# Patient Record
Sex: FEMALE | Race: WHITE | URBAN - METROPOLITAN AREA
[De-identification: names, ages, dates, MRNs, and addresses within clinical notes are randomized per-mention and may not be internally consistent; named-entity substitution may affect disease eponyms.]

---

## 2017-01-06 ENCOUNTER — TELEPHONE (OUTPATIENT)
Dept: GASTROENTEROLOGY | Facility: OUTPATIENT CENTER | Age: 52
End: 2017-01-06

## 2017-01-17 ENCOUNTER — TELEPHONE (OUTPATIENT)
Dept: GASTROENTEROLOGY | Facility: OUTPATIENT CENTER | Age: 52
End: 2017-01-17

## 2017-01-17 DIAGNOSIS — Z12.11 ENCOUNTER FOR SCREENING COLONOSCOPY: Primary | ICD-10-CM

## 2017-01-17 RX ORDER — SIMETHICONE 125 MG
125 CAPSULE ORAL SEE ADMIN INSTRUCTIONS
Qty: 1 CAPSULE | Refills: 0 | Status: SHIPPED | OUTPATIENT
Start: 2017-01-17 | End: 2017-10-24

## 2017-01-17 RX ORDER — PEG-3350, SODIUM SULFATE, SODIUM CHLORIDE, POTASSIUM CHLORIDE, SODIUM ASCORBATE AND ASCORBIC ACID 7.5-2.691G
1 KIT ORAL SEE ADMIN INSTRUCTIONS
Qty: 1 EACH | Refills: 0 | Status: SHIPPED | OUTPATIENT
Start: 2017-01-17 | End: 2017-10-24

## 2017-01-17 NOTE — TELEPHONE ENCOUNTER
Patient taking any blood thinners ? no    Heart disease ? denies    Lung disease ? denies      Sleep apnea ? denies    Diabetic ? denies    Kidney disease ? denies    Dialysis ? n/a    Electronic implanted medical devices ? denies    Are you taking any narcotic pain medication ? no  What is your daily dosage ?    PTSD ? n/a    Prep instructions reviewed with patient ? Instructions,  policy, conscious sedation plan reviewed. Advised pt. To have someone stay with her post exam    [facility] : [facility]    Indication for procedure : screening colonoscopy    Referring provider : Dr. Chelsey Quintero

## 2017-01-20 ENCOUNTER — TRANSFERRED RECORDS (OUTPATIENT)
Dept: HEALTH INFORMATION MANAGEMENT | Facility: CLINIC | Age: 52
End: 2017-01-20

## 2017-01-23 RX ORDER — OMEPRAZOLE 40 MG/1
CAPSULE, DELAYED RELEASE ORAL
Qty: 90 CAPSULE | Refills: 0 | OUTPATIENT
Start: 2017-01-23

## 2017-02-23 ENCOUNTER — MYC MEDICAL ADVICE (OUTPATIENT)
Dept: INTERNAL MEDICINE | Facility: CLINIC | Age: 52
End: 2017-02-23

## 2017-02-23 DIAGNOSIS — R05.9 COUGH: ICD-10-CM

## 2017-02-23 RX ORDER — OMEPRAZOLE 40 MG/1
40 CAPSULE, DELAYED RELEASE ORAL 2 TIMES DAILY
Qty: 90 CAPSULE | Refills: 1 | Status: SHIPPED | OUTPATIENT
Start: 2017-02-23 | End: 2017-05-25

## 2017-02-23 NOTE — TELEPHONE ENCOUNTER
Refill request received for Omeprazole . Her last clinic appointment was 10/19/16. Refill completed per [facility] RN protocol.

## 2017-05-25 DIAGNOSIS — R05.9 COUGH: ICD-10-CM

## 2017-05-26 RX ORDER — OMEPRAZOLE 40 MG/1
CAPSULE, DELAYED RELEASE ORAL
Qty: 90 CAPSULE | Refills: 0 | Status: SHIPPED | OUTPATIENT
Start: 2017-05-26 | End: 2017-10-24

## 2017-10-24 ENCOUNTER — OFFICE VISIT (OUTPATIENT)
Dept: INTERNAL MEDICINE | Facility: CLINIC | Age: 52
End: 2017-10-24
Attending: INTERNAL MEDICINE
Payer: COMMERCIAL

## 2017-10-24 VITALS
BODY MASS INDEX: 47.55 KG/M2 | HEART RATE: 94 BPM | WEIGHT: 277 LBS | SYSTOLIC BLOOD PRESSURE: 132 MMHG | DIASTOLIC BLOOD PRESSURE: 86 MMHG

## 2017-10-24 DIAGNOSIS — R05.9 COUGH: ICD-10-CM

## 2017-10-24 DIAGNOSIS — Z00.00 ROUTINE GENERAL MEDICAL EXAMINATION AT A HEALTH CARE FACILITY: Primary | ICD-10-CM

## 2017-10-24 LAB
ANION GAP SERPL CALCULATED.3IONS-SCNC: 7 MMOL/L (ref 3–14)
BUN SERPL-MCNC: 18 MG/DL (ref 7–30)
CALCIUM SERPL-MCNC: 8.7 MG/DL (ref 8.5–10.1)
CHLORIDE SERPL-SCNC: 106 MMOL/L (ref 94–109)
CHOLEST SERPL-MCNC: 237 MG/DL
CO2 SERPL-SCNC: 26 MMOL/L (ref 20–32)
CREAT SERPL-MCNC: 0.72 MG/DL (ref 0.52–1.04)
GFR SERPL CREATININE-BSD FRML MDRD: 85 ML/MIN/1.7M2
GLUCOSE SERPL-MCNC: 97 MG/DL (ref 70–99)
HBA1C MFR BLD: 5.3 % (ref 4.3–6)
HDLC SERPL-MCNC: 64 MG/DL
LDLC SERPL CALC-MCNC: 131 MG/DL
NONHDLC SERPL-MCNC: 173 MG/DL
POTASSIUM SERPL-SCNC: 4 MMOL/L (ref 3.4–5.3)
SODIUM SERPL-SCNC: 139 MMOL/L (ref 133–144)
TRIGL SERPL-MCNC: 212 MG/DL

## 2017-10-24 PROCEDURE — 36415 COLL VENOUS BLD VENIPUNCTURE: CPT | Performed by: INTERNAL MEDICINE

## 2017-10-24 PROCEDURE — 80048 BASIC METABOLIC PNL TOTAL CA: CPT | Performed by: INTERNAL MEDICINE

## 2017-10-24 PROCEDURE — 99213 OFFICE O/P EST LOW 20 MIN: CPT | Mod: ZF

## 2017-10-24 PROCEDURE — 80061 LIPID PANEL: CPT | Performed by: INTERNAL MEDICINE

## 2017-10-24 PROCEDURE — 83036 HEMOGLOBIN GLYCOSYLATED A1C: CPT | Performed by: INTERNAL MEDICINE

## 2017-10-24 RX ORDER — OMEPRAZOLE 40 MG/1
40 CAPSULE, DELAYED RELEASE ORAL DAILY
Qty: 90 CAPSULE | Refills: 3 | Status: SHIPPED | OUTPATIENT
Start: 2017-10-24

## 2017-10-24 ASSESSMENT — PAIN SCALES - GENERAL: PAINLEVEL: NO PAIN (0)

## 2017-10-24 NOTE — LETTER
10/24/2017       RE: Jenny Robbins  [address]  [place] [address]     Dear Colleague,    Thank you for referring your patient, Jenny Robbins, to the [facility]  at [facility]. Please see a copy of my visit note below.       SUBJECTIVE:   CC: Jenny Robbins is an 51 year old woman who presents for preventive health visit.     Healthy Habits:    Do you get at least three servings of calcium containing foods daily (dairy, green leafy vegetables, etc.)? yes    Amount of exercise or daily activities, outside of work: not regular    Problems taking medications regularly No    Medication side effects: No    Have you had an eye exam in the past two years? yes    Do you see a dentist twice per year? yes    Do you have sleep apnea, excessive snoring or daytime drowsiness?no          -------------------------------------    Today's PHQ-2 Score: PHQ-2 ( 1999 Pfizer) 10/23/2017 10/2/2016   Q1: Little interest or pleasure in doing things 0 -   Q2: Feeling down, depressed or hopeless 0 -   PHQ-2 Score 0 -   Q1: Little interest or pleasure in doing things Not at all Not at all   Q2: Feeling down, depressed or hopeless Not at all Several days   PHQ-2 Score 0 1         Abuse: Current or Past(Physical, Sexual or Emotional)- No  Do you feel safe in your environment - Yes  Social History   Substance Use Topics     Smoking status: Former Smoker     Packs/day: 0.25     Years: 5.00     Types: Cigarettes     Start date: 1/1/1991     Quit date: 2/1/2000     Smokeless tobacco: Never Used     Alcohol use Yes      Comment: 1-2 drinks every other week or so.     The patient does not drink >3 drinks per day nor >7 drinks per week.    Reviewed orders with patient.  Reviewed health maintenance and updated orders accordingly - Yes  Labs reviewed in EPIC    Patient over age 50, mutual decision to screen reflected in health maintenance.      Pertinent mammograms are  reviewed under the imaging tab.  History of abnormal Pap smear: NO - age 30-65 PAP every 5 years with negative HPV co-testing recommended    Reviewed and updated as needed this visit by clinical staffTobacco  Allergies  Meds         Reviewed and updated as needed this visit by Provider        Past Medical History:   Diagnosis Date     Allergic rhinitis      Chronic sinusitis      Environmental allergies      Esophageal reflux 6/8/2016     Gastro-oesophageal reflux disease 1/1/2014     Hoarseness March 5    Had nodules on vocal chords as a child.     Major depression      Obesity      Uncomplicated asthma 10/1/16      Past Surgical History:   Procedure Laterality Date     DENTAL SURGERY      wisdom     LAPAROSCOPIC SALPINGO-OOPHORECTOMY  2000    left       ROS:  ROS answers submitted by the patient were reviewed on the day of the visit.   Review of Systems     Constitutional:  Negative for fever, chills, weight loss, weight gain, fatigue, decreased appetite, night sweats, recent stressors, height gain, height loss, post-operative complications, incisional pain, hallucinations, increased energy, hyperactivity and confused.   HENT:  Negative for ear pain, hearing loss, tinnitus, nosebleeds, trouble swallowing, hoarse voice, mouth sores, sore throat, ear discharge, tooth pain, gum tenderness, taste disturbance, smell disturbance, hearing aid, bleeding gums, dry mouth, sinus pain, sinus congestion and neck mass.    Eyes:  Negative for double vision, pain, redness, eye pain, decreased vision, eye watering, eye bulging, eye dryness, flashing lights, spots, floaters, strabismus, tunnel vision, jaundice and eye irritation.   Respiratory:   Negative for cough, hemoptysis, sputum production, shortness of breath, wheezing, sleep disturbances due to breathing, snores loudly, respiratory pain, dyspnea on exertion, cough disturbing sleep and postural dyspnea.    Cardiovascular:  Negative for chest pain, dyspnea on exertion,  palpitations, orthopnea, claudication, leg swelling, fingers/toes turn blue, hypertension, hypotension, syncope, history of heart murmur, chest pain on exertion, chest pain at rest, pacemaker, few scattered varicosities, leg pain, sleep disturbances due to breathing, tachycardia, light-headedness, exercise intolerance and edema.   Gastrointestinal:  Negative for heartburn, nausea, vomiting, abdominal pain, diarrhea, constipation, blood in stool, melena, rectal pain, bloating, hemorrhoids, bowel incontinence, jaundice, rectal bleeding, coffee ground emesis and change in stool.   Genitourinary:  Negative for bladder incontinence, dysuria, urgency, hematuria, flank pain, vaginal discharge, difficulty urinating, genital sores, dyspareunia, decreased libido, nocturia, voiding less frequently, arousal difficulty, abnormal vaginal bleeding, excessive menstruation, menstrual changes, hot flashes, vaginal dryness and postmenopausal bleeding.   Musculoskeletal:  Negative for myalgias, back pain, joint swelling, arthralgias, stiffness, muscle cramps, neck pain, bone pain, muscle weakness and fracture.   Skin:  Negative for nail changes, itching, poor wound healing, rash, hair changes, skin changes, acne, warts, poor wound healing, scarring, flaky skin, Raynaud's phenomenon, sensitivity to sunlight and skin thickening.   Neurological:  Negative for dizziness, tingling, tremors, speech change, seizures, loss of consciousness, weakness, light-headedness, numbness, headaches, disturbances in coordination, extremity numbness, memory loss, difficulty walking and paralysis.   Endo/Heme:  Negative for anemia, swollen glands and bruises/bleeds easily.   Psychiatric/Behavioral:  Negative for depression, hallucinations, memory loss, decreased concentration, mood swings and panic attacks.    Breast:  Negative for breast discharge, breast mass, breast pain and nipple retraction.   Endocrine:  Negative for altered temperature regulation,  polyphagia, polydipsia, unwanted hair growth and change in facial hair.      OBJECTIVE:   /86  Pulse 94  Wt 125.6 kg (277 lb)  LMP 04/29/2016  Breastfeeding? No  BMI 47.55 kg/m2  EXAM:  GENERAL APPEARANCE: healthy, alert and no distress  EYES: Eyes grossly normal to inspection, PERRL and conjunctivae and sclerae normal  HENT: ear canals and TM's normal, nose and mouth without ulcers or lesions, oropharynx clear and oral mucous membranes moist  NECK: no adenopathy, no asymmetry, masses, or scars and thyroid normal to palpation  RESP: lungs clear to auscultation - no rales, rhonchi or wheezes  CV: regular rate and rhythm, normal S1 S2, no S3 or S4, no murmur, click or rub, no peripheral edema and peripheral pulses strong  ABDOMEN: soft, nontender, no hepatosplenomegaly, no masses and bowel sounds normal  MS: no musculoskeletal defects are noted and gait is age appropriate without ataxia  SKIN: no suspicious lesions or rashes  NEURO: Normal strength and tone, sensory exam grossly normal, mentation intact and speech normal  PSYCH: mentation appears normal and affect normal/bright    ASSESSMENT/PLAN:   1. Routine general medical examination at a health care facility  Patient is up to date on colon cancer screening. Recommend screening for diabetes and hyperlipidemia.   - Lipid Profile  - Basic Metabolic Panel  - Hemoglobin A1c    2. Cough  Refill for omeprazole was given to the patient today.   - omeprazole (PRILOSEC) 40 MG capsule; Take 1 capsule (40 mg) by mouth daily  Dispense: 90 capsule; Refill: 3    COUNSELING:   Reviewed preventive health counseling, as reflected in patient instructions       Regular exercise       Healthy diet/nutrition       Vision screening         reports that she quit smoking about 17 years ago. Her smoking use included Cigarettes. She started smoking about 26 years ago. She has a 1.25 pack-year smoking history. She has never used smokeless tobacco.    Estimated body mass index is  "47.55 kg/(m^2) as calculated from the following:    Height as of 10/3/16: 1.626 m (5' 4\").    Weight as of this encounter: 125.6 kg (277 lb).         Counseling Resources:  ATP IV Guidelines  Pooled Cohorts Equation Calculator  Breast Cancer Risk Calculator  FRAX Risk Assessment  ICSI Preventive Guidelines  Dietary Guidelines for Americans, 2010  USDA's MyPlate  ASA Prophylaxis  Lung CA Screening      Again, thank you for allowing me to participate in the care of your patient.      Sincerely,    Chelsey Quintero MD      "

## 2017-10-24 NOTE — LETTER
10/27/2017         [address]  [place] [address]        Dear Ms. Robbins:    Your cholesterol was mildly elevated. Other results were within acceptable range    Results for orders placed or performed in visit on 10/24/17   Lipid Profile   Result Value Ref Range    Cholesterol 237 (H) <200 mg/dL    Triglycerides 212 (H) <150 mg/dL    HDL Cholesterol 64 >49 mg/dL    LDL Cholesterol Calculated 131 (H) <100 mg/dL    Non HDL Cholesterol 173 (H) <130 mg/dL   Basic Metabolic Panel   Result Value Ref Range    Sodium 139 133 - 144 mmol/L    Potassium 4.0 3.4 - 5.3 mmol/L    Chloride 106 94 - 109 mmol/L    Carbon Dioxide 26 20 - 32 mmol/L    Anion Gap 7 3 - 14 mmol/L    Glucose 97 70 - 99 mg/dL    Urea Nitrogen 18 7 - 30 mg/dL    Creatinine 0.72 0.52 - 1.04 mg/dL    GFR Estimate 85 >60 mL/min/1.7m2    GFR Estimate If Black >90 >60 mL/min/1.7m2    Calcium 8.7 8.5 - 10.1 mg/dL   Hemoglobin A1c   Result Value Ref Range    Hemoglobin A1C 5.3 4.3 - 6.0 %         Please note that test explanations are brief and do not reflect all diagnostic uses.  If you have any questions or concerns, please call the clinic at [phone].      Sincerely,      Natalia Marie sent on behalf of  Chelsey Quintero MD

## 2017-10-24 NOTE — MR AVS SNAPSHOT
After Visit Summary   10/24/2017    Jenny Robbins    MRN: [id number]           Patient Information     Date Of Birth          1965        Visit Information        Provider Department      10/24/2017 1:40 PM Chelsey Quintero MD [facility]         Today's Diagnoses     Routine general medical examination at a health care facility    -  1    Cough           Follow-ups after your visit        Who to contact     Please call your clinic at [phone] to:    Ask questions about your health    Make or cancel appointments    Discuss your medicines    Learn about your test results    Speak to your doctor   If you have compliments or concerns about an experience at your clinic, or if you wish to file a complaint, please contact [facility] Patient Relations at [phone] or email us at [email or web address]         Additional Information About Your Visit        MyChart Information     [employer] gives you secure access to your electronic health record. If you see a primary care provider, you can also send messages to your care team and make appointments. If you have questions, please call your primary care clinic.  If you do not have a primary care provider, please call [phone] and they will assist you.      [employer]uro is an electronic gateway that provides easy, online access to your medical records. With [employer], you can request a clinic appointment, read your test results, renew a prescription or communicate with your care team.     To access your existing account, please contact your [facility] or call [phone] for assistance.        Care EveryWhere ID     This is your Care EveryWhere ID. This could be used by other organizations to access your [facility] medical records  [id number]        Your Vitals Were     Pulse Last Period Breastfeeding? BMI (Body Mass Index)          94 04/29/2016 No 47.55  kg/m2         Blood Pressure from Last 3 Encounters:   10/24/17 132/86   10/03/16 116/79   08/17/16 135/76    Weight from Last 3 Encounters:   10/24/17 125.6 kg (277 lb)   10/03/16 122.7 kg (270 lb 8 oz)   08/17/16 122.3 kg (269 lb 9.6 oz)              We Performed the Following     Basic Metabolic Panel     Hemoglobin A1c     Lipid Profile          Today's Medication Changes          These changes are accurate as of: 10/24/17  2:14 PM.  If you have any questions, ask your nurse or doctor.               These medicines have changed or have updated prescriptions.        Dose/Directions    omeprazole 40 MG capsule   Commonly known as:  priLOSEC   This may have changed:  See the new instructions.   Used for:  Cough   Changed by:  Chelsey Quintero MD        Dose:  40 mg   Take 1 capsule (40 mg) by mouth daily   Quantity:  90 capsule   Refills:  3         Stop taking these medicines if you haven't already. Please contact your care team if you have questions.     magnesium citrate solution   Stopped by:  Chelsey Quintero MD           PEG-KCl-NaCl-NaSulf-Na Asc-C 100 G Solr   Commonly known as:  MOVIPREP   Stopped by:  Chelsey Quintero MD           Simethicone 125 MG Caps   Stopped by:  Chelsey Quintero MD                Where to get your medicines      These medications were sent to [facility] [id number] - [place], [place] - [address] AT [facility] [address], [place] [address]    Hours:  24-hours Phone:  [phone]     omeprazole 40 MG capsule                Primary Care Provider Office Phone # Fax #    Chelsey Quintero [phone]       [facility] [address]  [facility] [address]        Equal Access to Services     LAWRENCE PARTIDA AH: Lon Benjamin, wajonida lujocy, qaybta kaalmada sophie, nirmala llamas. So [facility] [phone].    ATENCIÓN: Si rodolfo starr sanon  disposición servicios gratuitos de asistencia lingüística. Devendra malone [phone].    We comply with applicable federal civil rights laws and [place] laws. We do not discriminate on the basis of race, color, national origin, age, disability, sex, sexual orientation, or gender identity.            Thank you!     Thank you for choosing [facility]   for your care. Our goal is always to provide you with excellent care. Hearing back from our patients is one way we can continue to improve our services. Please take a few minutes to complete the written survey that you may receive in the mail after your visit with us. Thank you!             Your Updated Medication List - Protect others around you: Learn how to safely use, store and throw away your medicines at www.disposemymeds.org.          This list is accurate as of: 10/24/17  2:14 PM.  Always use your most recent med list.                   Brand Name Dispense Instructions for use Diagnosis    fluticasone 27.5 MCG/SPRAY spray    VERAMYST     Spray 2 sprays into both nostrils daily        LAMICTAL PO      Take  by mouth.        omeprazole 40 MG capsule    priLOSEC    90 capsule    Take 1 capsule (40 mg) by mouth daily    Cough       ZOLOFT PO      Take  by mouth.

## 2017-10-24 NOTE — PROGRESS NOTES
SUBJECTIVE:   CC: Jenny Robbins is an 51 year old woman who presents for preventive health visit.     Healthy Habits:    Do you get at least three servings of calcium containing foods daily (dairy, green leafy vegetables, etc.)? yes    Amount of exercise or daily activities, outside of work: not regular    Problems taking medications regularly No    Medication side effects: No    Have you had an eye exam in the past two years? yes    Do you see a dentist twice per year? yes    Do you have sleep apnea, excessive snoring or daytime drowsiness?no          -------------------------------------    Today's PHQ-2 Score: PHQ-2 ( 1999 Pfizer) 10/23/2017 10/2/2016   Q1: Little interest or pleasure in doing things 0 -   Q2: Feeling down, depressed or hopeless 0 -   PHQ-2 Score 0 -   Q1: Little interest or pleasure in doing things Not at all Not at all   Q2: Feeling down, depressed or hopeless Not at all Several days   PHQ-2 Score 0 1         Abuse: Current or Past(Physical, Sexual or Emotional)- No  Do you feel safe in your environment - Yes  Social History   Substance Use Topics     Smoking status: Former Smoker     Packs/day: 0.25     Years: 5.00     Types: Cigarettes     Start date: 1/1/1991     Quit date: 2/1/2000     Smokeless tobacco: Never Used     Alcohol use Yes      Comment: 1-2 drinks every other week or so.     The patient does not drink >3 drinks per day nor >7 drinks per week.    Reviewed orders with patient.  Reviewed health maintenance and updated orders accordingly - Yes  Labs reviewed in [facility]    Patient over age 50, mutual decision to screen reflected in health maintenance.      Pertinent mammograms are reviewed under the imaging tab.  History of abnormal Pap smear: NO - age 30-65 PAP every 5 years with negative HPV co-testing recommended    Reviewed and updated as needed this visit by clinical staffTobao  Allergies  Meds         Reviewed and updated as needed this visit by Provider        Past  Medical History:   Diagnosis Date     Allergic rhinitis      Chronic sinusitis      Environmental allergies      Esophageal reflux 6/8/2016     Gastro-oesophageal reflux disease 1/1/2014     Hoarseness March 5    Had nodules on vocal chords as a child.     Major depression      Obesity      Uncomplicated asthma 10/1/16      Past Surgical History:   Procedure Laterality Date     DENTAL SURGERY      wisdom     LAPAROSCOPIC SALPINGO-OOPHORECTOMY  2000    left       ROS:  ROS answers submitted by the patient were reviewed on the day of the visit.   Review of Systems     Constitutional:  Negative for fever, chills, weight loss, weight gain, fatigue, decreased appetite, night sweats, recent stressors, height gain, height loss, post-operative complications, incisional pain, hallucinations, increased energy, hyperactivity and confused.   HENT:  Negative for ear pain, hearing loss, tinnitus, nosebleeds, trouble swallowing, hoarse voice, mouth sores, sore throat, ear discharge, tooth pain, gum tenderness, taste disturbance, smell disturbance, hearing aid, bleeding gums, dry mouth, sinus pain, sinus congestion and neck mass.    Eyes:  Negative for double vision, pain, redness, eye pain, decreased vision, eye watering, eye bulging, eye dryness, flashing lights, spots, floaters, strabismus, tunnel vision, jaundice and eye irritation.   Respiratory:   Negative for cough, hemoptysis, sputum production, shortness of breath, wheezing, sleep disturbances due to breathing, snores loudly, respiratory pain, dyspnea on exertion, cough disturbing sleep and postural dyspnea.    Cardiovascular:  Negative for chest pain, dyspnea on exertion, palpitations, orthopnea, claudication, leg swelling, fingers/toes turn blue, hypertension, hypotension, syncope, history of heart murmur, chest pain on exertion, chest pain at rest, pacemaker, few scattered varicosities, leg pain, sleep disturbances due to breathing, tachycardia, light-headedness,  exercise intolerance and edema.   Gastrointestinal:  Negative for heartburn, nausea, vomiting, abdominal pain, diarrhea, constipation, blood in stool, melena, rectal pain, bloating, hemorrhoids, bowel incontinence, jaundice, rectal bleeding, coffee ground emesis and change in stool.   Genitourinary:  Negative for bladder incontinence, dysuria, urgency, hematuria, flank pain, vaginal discharge, difficulty urinating, genital sores, dyspareunia, decreased libido, nocturia, voiding less frequently, arousal difficulty, abnormal vaginal bleeding, excessive menstruation, menstrual changes, hot flashes, vaginal dryness and postmenopausal bleeding.   Musculoskeletal:  Negative for myalgias, back pain, joint swelling, arthralgias, stiffness, muscle cramps, neck pain, bone pain, muscle weakness and fracture.   Skin:  Negative for nail changes, itching, poor wound healing, rash, hair changes, skin changes, acne, warts, poor wound healing, scarring, flaky skin, Raynaud's phenomenon, sensitivity to sunlight and skin thickening.   Neurological:  Negative for dizziness, tingling, tremors, speech change, seizures, loss of consciousness, weakness, light-headedness, numbness, headaches, disturbances in coordination, extremity numbness, memory loss, difficulty walking and paralysis.   Endo/Heme:  Negative for anemia, swollen glands and bruises/bleeds easily.   Psychiatric/Behavioral:  Negative for depression, hallucinations, memory loss, decreased concentration, mood swings and panic attacks.    Breast:  Negative for breast discharge, breast mass, breast pain and nipple retraction.   Endocrine:  Negative for altered temperature regulation, polyphagia, polydipsia, unwanted hair growth and change in facial hair.      OBJECTIVE:   /86  Pulse 94  Wt 125.6 kg (277 lb)  LMP 04/29/2016  Breastfeeding? No  BMI 47.55 kg/m2  EXAM:  GENERAL APPEARANCE: healthy, alert and no distress  EYES: Eyes grossly normal to inspection, PERRL and  "conjunctivae and sclerae normal  HENT: ear canals and TM's normal, nose and mouth without ulcers or lesions, oropharynx clear and oral mucous membranes moist  NECK: no adenopathy, no asymmetry, masses, or scars and thyroid normal to palpation  RESP: lungs clear to auscultation - no rales, rhonchi or wheezes  CV: regular rate and rhythm, normal S1 S2, no S3 or S4, no murmur, click or rub, no peripheral edema and peripheral pulses strong  ABDOMEN: soft, nontender, no hepatosplenomegaly, no masses and bowel sounds normal  MS: no musculoskeletal defects are noted and gait is age appropriate without ataxia  SKIN: no suspicious lesions or rashes  NEURO: Normal strength and tone, sensory exam grossly normal, mentation intact and speech normal  PSYCH: mentation appears normal and affect normal/bright    ASSESSMENT/PLAN:   1. Routine general medical examination at a health care facility  Patient is up to date on colon cancer screening. Recommend screening for diabetes and hyperlipidemia.   - Lipid Profile  - Basic Metabolic Panel  - Hemoglobin A1c    2. Cough  Refill for omeprazole was given to the patient today.   - omeprazole (PRILOSEC) 40 MG capsule; Take 1 capsule (40 mg) by mouth daily  Dispense: 90 capsule; Refill: 3    COUNSELING:   Reviewed preventive health counseling, as reflected in patient instructions       Regular exercise       Healthy diet/nutrition       Vision screening         reports that she quit smoking about 17 years ago. Her smoking use included Cigarettes. She started smoking about 26 years ago. She has a 1.25 pack-year smoking history. She has never used smokeless tobacco.    Estimated body mass index is 47.55 kg/(m^2) as calculated from the following:    Height as of 10/3/16: 1.626 m (5' 4\").    Weight as of this encounter: 125.6 kg (277 lb).         Counseling Resources:  ATP IV Guidelines  Pooled Cohorts Equation Calculator  Breast Cancer Risk Calculator  FRAX Risk Assessment  ICSI Preventive " Guidelines  Dietary Guidelines for Americans, 2010  USDA's MyPlate  ASA Prophylaxis  Lung CA Screening    Chelsey Quintero MD  [facility]

## 2017-10-29 ASSESSMENT — ENCOUNTER SYMPTOMS
HOARSE VOICE: 0
DIARRHEA: 0
TACHYCARDIA: 0
MUSCLE WEAKNESS: 0
WEIGHT LOSS: 0
DIFFICULTY URINATING: 0
NECK PAIN: 0
SEIZURES: 0
TREMORS: 0
POOR WOUND HEALING: 0
FATIGUE: 0
NAUSEA: 0
NAIL CHANGES: 0
NERVOUS/ANXIOUS: 0
HOT FLASHES: 0
HEMOPTYSIS: 0
EXTREMITY NUMBNESS: 0
PANIC: 0
ALTERED TEMPERATURE REGULATION: 0
BOWEL INCONTINENCE: 0
EYE IRRITATION: 0
NUMBNESS: 0
LEG SWELLING: 0
SNORES LOUDLY: 0
ORTHOPNEA: 0
INSOMNIA: 0
SYNCOPE: 0
BREAST MASS: 0
EXERCISE INTOLERANCE: 0
SLEEP DISTURBANCES DUE TO BREATHING: 0
BACK PAIN: 0
DIZZINESS: 0
POSTURAL DYSPNEA: 0
JOINT SWELLING: 0
DYSURIA: 0
SORE THROAT: 0
MYALGIAS: 0
ABDOMINAL PAIN: 0
TROUBLE SWALLOWING: 0
DYSPNEA ON EXERTION: 0
WHEEZING: 0
CONSTIPATION: 0
SMELL DISTURBANCE: 0
COUGH: 0
DECREASED CONCENTRATION: 0
DECREASED LIBIDO: 0
POLYDIPSIA: 0
DOUBLE VISION: 0
LIGHT-HEADEDNESS: 0
SPUTUM PRODUCTION: 0
MEMORY LOSS: 0
SINUS PAIN: 0
FLANK PAIN: 0
HEADACHES: 0
WEAKNESS: 0
BLOATING: 0
HEARTBURN: 0
NIGHT SWEATS: 0
HYPERTENSION: 0
JAUNDICE: 0
FEVER: 0
ARTHRALGIAS: 0
COUGH DISTURBING SLEEP: 0
LEG PAIN: 0
SKIN CHANGES: 0
POLYPHAGIA: 0
TASTE DISTURBANCE: 0
MUSCLE CRAMPS: 0
PARALYSIS: 0
SPEECH CHANGE: 0
TINGLING: 0
WEIGHT GAIN: 0
CLAUDICATION: 0
DECREASED APPETITE: 0
RECTAL BLEEDING: 0
SWOLLEN GLANDS: 0
STIFFNESS: 0
DEPRESSION: 0
SHORTNESS OF BREATH: 0
BLOOD IN STOOL: 0
LOSS OF CONSCIOUSNESS: 0
SINUS CONGESTION: 0
HALLUCINATIONS: 0
HEMATURIA: 0
VOMITING: 0
PALPITATIONS: 0
BRUISES/BLEEDS EASILY: 0
BREAST PAIN: 0
RECTAL PAIN: 0
DISTURBANCES IN COORDINATION: 0
EYE WATERING: 0
RESPIRATORY PAIN: 0
NECK MASS: 0
CHILLS: 0
EYE REDNESS: 0
INCREASED ENERGY: 0
EYE PAIN: 0
HYPOTENSION: 0

## 2018-05-24 ENCOUNTER — VIRTUAL VISIT (OUTPATIENT)
Dept: FAMILY MEDICINE | Facility: OTHER | Age: 53
End: 2018-05-24

## 2018-05-24 NOTE — PROGRESS NOTES
"Date:   Clinician: Edwin Mchugh  Clinician NPI: [id number]  Patient: Jenny Robbins  Patient : 1965  Patient Address: [address] [place] [address]  Patient Phone: [phone]  Visit Protocol: URI  Patient Summary:  Jenny is a 52 year old ( : 1965 ) female who initiated a Visit for cold, sinus infection, or influenza. When asked the question \"Please sign me up to receive news, health information and promotions from [employer].\", Jenny responded \"No\".    Jenny states her symptoms started gradually 10-13 days ago.   Her symptoms consist of malaise, myalgia, facial pain or pressure, a cough, nasal congestion, a sore throat, a headache, and rhinitis.   Symptom details     Nasal secretions: The color of her mucus is yellow, white, blood-tinged, and green.    Cough: Jenny coughs a few times an hour and her cough is more bothersome at night. Phlegm comes into her throat when she coughs. She believes the phlegm causes the cough. The color of the phlegm is blood-tinged, green, yellow, and white.     Sore throat: Jenny reports having mild throat pain (between 1-3 on a 10 point pain scale), does not have exudate on her tonsils, and can swallow liquids. The lymph nodes in her neck are not enlarged. A rash has not appeared on the skin since the sore throat started.     Facial pain or pressure: The facial pain or pressure feels worse when bending over or leaning forward.     Headache: She states the headache is mild (between 1-3 on a 10 point pain scale).      Jenny denies having chills, wheezing, fever, dyspnea, ear pain, enlarged lymph nodes, and teeth pain. She also denies having recent facial or sinus surgery in the past 60 days, taking antibiotic medication for the symptoms, and double sickening (worsening symptoms after initial improvement).   Within the past week, Jenny has not been exposed to someone with strep throat. She has not recently been exposed to someone with " "influenza. Jenny has not been in close contact with any high risk individuals.   Jenny had 1 sinus infection within the past year.   Weight: 280 lbs   Jenny does not smoke or use smokeless tobacco.   She denies pregnancy and denies breastfeeding. She does not menstruate.   Additional information as reported by the patient (free text): I've had this infection in the past; I know the symptoms. The only thing that cures it is 10 days of augmentin - which I haven't taken in more than a year. I used to have this sinus problem all the time until I started allergy treatments, but I believe the bad allergy season has caused me to get it again.  MEDICATIONS: [{\"id\"=&gt;[id number], \"description\"=&gt;\"omeprazole oral\"}, {\"id\"=&gt;[id number], \"description\"=&gt;\"Zoloft oral\"}, {\"id\"=&gt;[id number], \"description\"=&gt;\"Lamictal oral\"}], ALLERGIES: []  Clinician Response:  Dear Jenny,  Based on the information provided, you have acute bacterial sinusitis, also known as a sinus infection. Sinus infections are caused by bacteria or a virus and symptoms are almost always identical. The difference between the 2 types of infections is timing.  Sinus infections start as viral infections and symptoms improve on their own in about 7 days. If symptoms have not improved after 7 days or have even worsened, a bacterial infection may have developed.  Medication information  I am prescribing:     Amoxicillin-pot clavulanate 875-125 mg oral tablet. Take 1 tablet by mouth every 12 hours for 10 days. There are no refills with this prescription.   Antibiotics can cause an allergic reaction even if you have taken them without a problem in the past. If you develop a new rash, swelling, or difficulty breathing, stop the medication and be seen in a clinic or urgent care immediately.  A yeast infection is a side effect of taking antibiotics in some women. Please use [facility] to get treatment if you have symptoms of a yeast infection.  If you become " pregnant during this course of treatment, stop taking the medication and contact your primary care provider.  Self care  The following tips will keep you as comfortable as possible while you recover:     Rest    Drink plenty of water and other liquids    Take a hot shower to loosen congestion    Use throat lozenges    Gargle with warm salt water (1/4 teaspoon of salt per 8 ounce glass of water)    Suck on frozen items such as popsicles or ice cubes    Drink hot tea with lemon and honey    Take a spoonful of honey to reduce your cough     When to seek care  Please be seen in a clinic or urgent care if any of the following occur:     Symptoms do not start to improve after 3 days of treatment    New symptoms develop, or symptoms become worse     Call 911 or go to the emergency room if you feel that your throat is closing off, you suddenly develop a rash, you are unable to swallow fluids, you are drooling, or you are having difficulty breathing.   Diagnosis: Acute bacterial sinusitis  Diagnosis ICD: J01.90  Prescription: amoxicillin-pot clavulanate 875-125 mg oral tablet 20 tablet, 10 days supply. Take 1 tablet by mouth every 12 hours for 10 days. Refills: 0, Refill as needed: no, Allow substitutions: yes  Pharmacy: [id number] - [phone] - [address], [place] [address]

## 2018-12-04 ENCOUNTER — OFFICE VISIT - HEALTHEAST (OUTPATIENT)
Dept: FAMILY MEDICINE | Facility: CLINIC | Age: 53
End: 2018-12-04

## 2018-12-04 DIAGNOSIS — Z00.00 ROUTINE GENERAL MEDICAL EXAMINATION AT A HEALTH CARE FACILITY: ICD-10-CM

## 2018-12-04 DIAGNOSIS — F33.42 RECURRENT MAJOR DEPRESSIVE DISORDER, IN FULL REMISSION (H): ICD-10-CM

## 2018-12-04 DIAGNOSIS — F50.89 COMPULSIVE OVEREATING: ICD-10-CM

## 2018-12-04 LAB
ALBUMIN SERPL-MCNC: 4.1 G/DL (ref 3.5–5)
ALP SERPL-CCNC: 106 U/L (ref 45–120)
ALT SERPL W P-5'-P-CCNC: 26 U/L (ref 0–45)
ANION GAP SERPL CALCULATED.3IONS-SCNC: 10 MMOL/L (ref 5–18)
AST SERPL W P-5'-P-CCNC: 23 U/L (ref 0–40)
BILIRUB SERPL-MCNC: 0.5 MG/DL (ref 0–1)
BUN SERPL-MCNC: 11 MG/DL (ref 8–22)
CALCIUM SERPL-MCNC: 9.7 MG/DL (ref 8.5–10.5)
CHLORIDE BLD-SCNC: 104 MMOL/L (ref 98–107)
CO2 SERPL-SCNC: 28 MMOL/L (ref 22–31)
CREAT SERPL-MCNC: 0.69 MG/DL (ref 0.6–1.1)
GFR SERPL CREATININE-BSD FRML MDRD: >60 ML/MIN/1.73M2
GLUCOSE BLD-MCNC: 84 MG/DL (ref 70–125)
POTASSIUM BLD-SCNC: 4.9 MMOL/L (ref 3.5–5)
PROT SERPL-MCNC: 7.2 G/DL (ref 6–8)
SODIUM SERPL-SCNC: 142 MMOL/L (ref 136–145)

## 2018-12-04 ASSESSMENT — MIFFLIN-ST. JEOR: SCORE: 1848.94

## 2018-12-05 ENCOUNTER — TELEPHONE (OUTPATIENT)
Dept: FAMILY MEDICINE | Facility: CLINIC | Age: 53
End: 2018-12-05

## 2018-12-05 NOTE — TELEPHONE ENCOUNTER
Following message received under customer service request via my chart      Topic: General Compliment          Good morning, I saw a new doctor this week and received a new mychart code. The new provider said I didn't have an account. But I do. Is there any way to merge my old account with the new one? Thanks. My new activation code if you need it is [email or web address] I haven't activated it yet. I will wait until I hear from you. Thanks.         Writer advised patient to call my chart [phone] to address this request/question    Lizzie Quintanilla Registered Nurse   [facility] and [facility]

## 2018-12-21 ENCOUNTER — COMMUNICATION - HEALTHEAST (OUTPATIENT)
Dept: FAMILY MEDICINE | Facility: CLINIC | Age: 53
End: 2018-12-21

## 2019-02-18 ENCOUNTER — COMMUNICATION - HEALTHEAST (OUTPATIENT)
Dept: FAMILY MEDICINE | Facility: CLINIC | Age: 54
End: 2019-02-18

## 2019-03-14 ENCOUNTER — VIRTUAL VISIT (OUTPATIENT)
Dept: FAMILY MEDICINE | Facility: OTHER | Age: 54
End: 2019-03-14

## 2019-03-14 NOTE — PROGRESS NOTES
"Date:   Clinician: Jake Ferrera  Clinician NPI: [id number]  Patient: Jenny Robbins  Patient : 1965  Patient Address: [address] [place] [address]  Patient Phone: [phone]  Visit Protocol: URI  Patient Summary:  Jenny is a 53 year old ( : 1965 ) female who initiated a Visit for cold, sinus infection, or influenza. When asked the question \"Please sign me up to receive news, health information and promotions from [employer].\", Jenny responded \"No\".    Jenny states her symptoms started gradually 7-9 days ago. After her symptoms started, they improved and then got worse again.   Her symptoms consist of malaise, facial pain or pressure, a cough, rhinitis, nasal congestion, myalgia, a headache, a sore throat, and ear pain. She is experiencing difficulty breathing due to nasal congestion but she is not short of breath.   Symptom details     Nasal secretions: The color of her mucus is yellow, clear, and green.    Cough: Jenny coughs a few times an hour and her cough is not more bothersome at night. Phlegm comes into her throat when she coughs. She believes the phlegm causes the cough. The color of the phlegm is clear and white.     Sore throat: Jenny reports having mild throat pain (1-3 on a 10 point pain scale), does not have exudate on her tonsils, and can swallow liquids. The lymph nodes in her neck are not enlarged. A rash has not appeared on the skin since the sore throat started.     Facial pain or pressure: The facial pain or pressure feels worse when bending over or leaning forward.     Headache: She states the headache is mild (1-3 on a 10 point pain scale).      Jenny denies having fever, chills, wheezing, teeth pain, and enlarged lymph nodes. She also denies taking antibiotic medication for the symptoms and having recent facial or sinus surgery in the past 60 days.   Within the past week, Jenny has not been exposed to someone with strep throat. She has not " recently been exposed to someone with influenza. Jenny has not been in close contact with any high risk individuals.   Jenny had 1 sinus infection within the past year.   Weight: 280 lbs   Jenny does not smoke or use smokeless tobacco.   MEDICATIONS: Lamictal oral, Zoloft oral, omeprazole oral, ALLERGIES: NKDA  Clinician Response:  Dear Jenny,  Based on the information provided, you have acute bacterial sinusitis, also known as a sinus infection. Sinus infections are caused by bacteria or a virus and symptoms are almost always identical. The difference between the 2 types of infections is timing.  Sinus infections start as viral infections and symptoms improve on their own in about 7 days. If symptoms have not improved after 7 days or have even worsened, a bacterial infection may have developed.  Medication information  I am prescribing:     Amoxicillin 500 mg oral tablet. Take 1 tablet by mouth every 8 hours for 10 days. There are no refills with this prescription.   Antibiotics can cause an allergic reaction even if you have taken them without a problem in the past. If you develop a new rash, swelling, or difficulty breathing, stop the medication and be seen in a clinic or urgent care immediately.  A yeast infection is a side effect of taking antibiotics in some women. Please use [facility] to get treatment if you have symptoms of a yeast infection.  Self care  The following tips will keep you as comfortable as possible while you recover:     Rest    Drink plenty of water and other liquids    Take a hot shower to loosen congestion    Use throat lozenges    Gargle with warm salt water (1/4 teaspoon of salt per 8 ounce glass of water)    Suck on frozen items such as popsicles or ice cubes    Drink hot tea with lemon and honey    Take a spoonful of honey to reduce your cough     When to seek care  Please be seen in a clinic or urgent care if any of the following occur:     Symptoms do not start to improve  after 3 days of treatment    New symptoms develop, or symptoms become worse     Call 911 or go to the emergency room if you feel that your throat is closing off, you suddenly develop a rash, you are unable to swallow fluids, you are drooling, or you are having difficulty breathing.   Diagnosis: Acute bacterial sinusitis  Diagnosis ICD: J01.90  Prescription: amoxicillin 500 mg oral tablet 30 tablet, 10 days supply. Take 1 tablet by mouth every 8 hours for 10 days. Refills: 0, Refill as needed: no, Allow substitutions: yes  Pharmacy: [facility] - [phone] - [address] [address]

## 2019-03-20 ENCOUNTER — COMMUNICATION - HEALTHEAST (OUTPATIENT)
Dept: FAMILY MEDICINE | Facility: CLINIC | Age: 54
End: 2019-03-20

## 2019-03-20 DIAGNOSIS — F33.9 EPISODE OF RECURRENT MAJOR DEPRESSIVE DISORDER, UNSPECIFIED DEPRESSION EPISODE SEVERITY (H): ICD-10-CM

## 2019-03-20 DIAGNOSIS — F50.89 COMPULSIVE OVEREATING: ICD-10-CM

## 2019-04-19 ENCOUNTER — COMMUNICATION - HEALTHEAST (OUTPATIENT)
Dept: SCHEDULING | Facility: CLINIC | Age: 54
End: 2019-04-19

## 2019-04-22 ENCOUNTER — OFFICE VISIT - HEALTHEAST (OUTPATIENT)
Dept: INTERNAL MEDICINE | Facility: CLINIC | Age: 54
End: 2019-04-22

## 2019-04-22 DIAGNOSIS — M70.50 ANSERINE BURSITIS: ICD-10-CM

## 2019-04-22 ASSESSMENT — MIFFLIN-ST. JEOR: SCORE: 1887.77

## 2019-06-20 ENCOUNTER — COMMUNICATION - HEALTHEAST (OUTPATIENT)
Dept: FAMILY MEDICINE | Facility: CLINIC | Age: 54
End: 2019-06-20

## 2019-06-20 DIAGNOSIS — F50.89 COMPULSIVE OVEREATING: ICD-10-CM

## 2019-06-20 DIAGNOSIS — F33.9 EPISODE OF RECURRENT MAJOR DEPRESSIVE DISORDER, UNSPECIFIED DEPRESSION EPISODE SEVERITY (H): ICD-10-CM

## 2019-09-17 ENCOUNTER — COMMUNICATION - HEALTHEAST (OUTPATIENT)
Dept: FAMILY MEDICINE | Facility: CLINIC | Age: 54
End: 2019-09-17

## 2019-09-17 DIAGNOSIS — F33.9 EPISODE OF RECURRENT MAJOR DEPRESSIVE DISORDER, UNSPECIFIED DEPRESSION EPISODE SEVERITY (H): ICD-10-CM

## 2019-09-17 DIAGNOSIS — F50.89 COMPULSIVE OVEREATING: ICD-10-CM

## 2019-09-29 ENCOUNTER — HEALTH MAINTENANCE LETTER (OUTPATIENT)
Age: 54
End: 2019-09-29

## 2019-12-18 ENCOUNTER — COMMUNICATION - HEALTHEAST (OUTPATIENT)
Dept: FAMILY MEDICINE | Facility: CLINIC | Age: 54
End: 2019-12-18

## 2019-12-18 DIAGNOSIS — F50.89 COMPULSIVE OVEREATING: ICD-10-CM

## 2019-12-18 DIAGNOSIS — Z79.899 HIGH RISK MEDICATION USE: ICD-10-CM

## 2019-12-18 DIAGNOSIS — F33.9 EPISODE OF RECURRENT MAJOR DEPRESSIVE DISORDER, UNSPECIFIED DEPRESSION EPISODE SEVERITY (H): ICD-10-CM

## 2019-12-18 DIAGNOSIS — Z13.220 SCREENING, LIPID: ICD-10-CM

## 2019-12-18 DIAGNOSIS — Z11.59 NEED FOR HEPATITIS C SCREENING TEST: ICD-10-CM

## 2020-03-15 ENCOUNTER — HEALTH MAINTENANCE LETTER (OUTPATIENT)
Age: 55
End: 2020-03-15

## 2020-04-10 ENCOUNTER — COMMUNICATION - HEALTHEAST (OUTPATIENT)
Dept: FAMILY MEDICINE | Facility: CLINIC | Age: 55
End: 2020-04-10

## 2021-01-14 ENCOUNTER — HEALTH MAINTENANCE LETTER (OUTPATIENT)
Age: 56
End: 2021-01-14

## 2021-05-28 NOTE — TELEPHONE ENCOUNTER
RN triage   Call from pt   Pt states R knee pain for a couple of months - no hx of injury--   Pt states pain getting worse - especially the past week   Pt is able to walk -- no limping -- pt is able to do ADL's   No pain at rest   Has pain when standing up and when doing stairs   Reviewed home care advice   Per protocol = should be see n  Transferred to    Liz Ledbetter RN [facility] RN triage      Reason for Disposition    MILD knee pain persists > 7 days    Protocols used: KNEE PAIN-A-OH

## 2021-05-28 NOTE — PROGRESS NOTES
ASSESSMENT:  1. Anserine bursitis  Knee exam normal.  Focal medial tenderness over tibial plateau suggests bursitis.  Treatment as below        PLAN:  Patient Instructions   Anserine bursitis on the medial inside knee    Aleve or advil, 2 pills twice a day with food for 2 weeks    If that does not do it, 3 options:  Orthopedic surgeon  Rheumatologist to consider injection or another diagnosis  Physical therapy     Wear brace      No orders of the defined types were placed in this encounter.    There are no discontinued medications.    Return if symptoms worsen or fail to improve.      CHIEF COMPLAINT:  Chief Complaint   Patient presents with     Knee Pain     (R) knee x 1 wk       HISTORY OF PRESENT ILLNESS:  Jenny is a 53 y.o. female presenting to the clinic today with complaints of knee pain.     Knee Pain: She has been experiencing pain in her right knee lately, and it has been worse in the past couple weeks. She does not have pain when sitting, but she gets a sharp pain in the knee when she is doing something on the leg, especially with going up stairs. There were times when she would have to lean against her car for a minute after getting out of it to allow the pain to calm down. Rest helps and activity seems to make it worse. It is only in her right knee; her left knee feels fine. Taking Advil over the weekend was helpful. The pain feels like it is more inside the knee joint rather than anywhere superficial. She struggles to point to a specific location that is the most painful. There was no injury and the knee has not been swollen. She has never had anything like this before.     REVIEW OF SYSTEMS:   Denies fever, chills, headache, shortness of breath, chest pain, tingling or numbness.     PFSH:  Reviewed, as below.     TOBACCO USE:  Social History     Tobacco Use   Smoking Status Former Smoker     Packs/day: 0.25     Years: 8.00     Pack years: 2.00     Last attempt to quit: 1/1/2000     Years since  "quittin.3   Smokeless Tobacco Never Used       VITALS:  Vitals:    19 1110   BP: 138/88   Patient Site: Left Arm   Patient Position: Sitting   Cuff Size: Adult Large   Pulse: 86   Resp: 16   SpO2: 97%   Weight: (!) 290 lb 1 oz (131.6 kg)   Height: 5' 3.5\" (1.613 m)     Wt Readings from Last 3 Encounters:   19 (!) 290 lb 1 oz (131.6 kg)   18 (!) 281 lb 8 oz (127.7 kg)     Body mass index is 50.58 kg/m .    PHYSICAL EXAM:  Constitutional:  Reveals an alert, pleasant, talkative, obese woman. Affect appropriate.  Vitals:  Per nursing notes.  Right Leg: Tender over medial tibial plateau, no effusion, no swelling, no tenderness laterally or posteriorly.   Cardiac: S1 S2 heart sounds. No murmurs.   Respiratory: Clear lung sounds bilateral. No wheezes or rales.   Abdomen: Obese. Active bowel sounds.   Neurologic: speech clear.     Psychiatric:  Mood appropriate, memory intact.       MEDICATIONS:  Current Outpatient Medications   Medication Sig Dispense Refill     lamoTRIgine (LAMICTAL) 200 MG tablet Take 1 tablet (200 mg total) by mouth Daily at 8:00 am.. 90 tablet 0     omeprazole (PRILOSEC) 20 MG capsule Take 20 mg by mouth as needed.              sertraline (ZOLOFT) 100 MG tablet Take 2 tablets (200 mg total) by mouth Daily at 8:00 am.. 180 tablet 0     No current facility-administered medications for this visit.        ADDITIONAL HISTORY SUMMARIZED (2): None.  DECISION TO OBTAIN EXTRA INFORMATION (1): None.   RADIOLOGY TESTS (1): None.  LABS (1): None.  MEDICINE TESTS (1): None.  INDEPENDENT REVIEW (2 each): None.     The visit lasted a total of 10 minutes face to face with the patient. Over 50% of the time was spent counseling and educating the patient about her knee pain.    We, Bari Thompson, a trained medical scribe, and Tereso Fernandez, a nurse practitioner student, are scribing for and in the presence of, Dr. Loyd.    I, Dr. Loyd, personally performed the services described in this " documentation, as scribed by Bari Thompson and Tereso Fernandez in my presence, and it is both accurate and complete.    Total data points: 0

## 2021-05-28 NOTE — PATIENT INSTRUCTIONS - HE
Anserine bursitis on the medial inside knee    Aleve or advil, 2 pills twice a day with food for 2 weeks    If that does not do it, 3 options:  Orthopedic surgeon  Rheumatologist to consider injection or another diagnosis  Physical therapy     Wear brace

## 2021-05-29 NOTE — TELEPHONE ENCOUNTER
RN cannot approve Refill Request    RN can NOT refill this medication overdue for office visits and/or labs.    Gutierrez Guerra, [facility] Triage/Med Refill 6/21/2019    Requested Prescriptions   Pending Prescriptions Disp Refills     lamoTRIgine (LAMICTAL) 200 MG tablet 90 tablet 0     Sig: Take 1 tablet (200 mg total) by mouth Daily at 8:00 am..       Lamotrigine Refill Protocol Failed - 6/20/2019 11:41 AM        Failed - CBC (Hemogram 2) in last year      No results found for: WBC, RBC, HGB, HCT, MCV, MCH, MCHC, RDW, PLT, MPV             Passed - PCP or prescribing provider visit in past 12 months       Last office visit with prescriber/PCP: Visit date not found OR same dept: Visit date not found OR same specialty: Visit date not found  Last physical: 12/4/2018 Last MTM visit: Visit date not found   Next visit within 3 mo: Visit date not found  Next physical within 3 mo: Visit date not found  Prescriber OR PCP: Kayla Juarez MD  Last diagnosis associated with med order: 1. Compulsive overeating  - lamoTRIgine (LAMICTAL) 200 MG tablet; Take 1 tablet (200 mg total) by mouth Daily at 8:00 am..  Dispense: 90 tablet; Refill: 0  - sertraline (ZOLOFT) 100 MG tablet; Take 2 tablets (200 mg total) by mouth Daily at 8:00 am..  Dispense: 180 tablet; Refill: 0    2. Episode of recurrent major depressive disorder, unspecified depression episode severity (H)  - lamoTRIgine (LAMICTAL) 200 MG tablet; Take 1 tablet (200 mg total) by mouth Daily at 8:00 am..  Dispense: 90 tablet; Refill: 0  - sertraline (ZOLOFT) 100 MG tablet; Take 2 tablets (200 mg total) by mouth Daily at 8:00 am..  Dispense: 180 tablet; Refill: 0    If protocol passes may refill for 12 months if within 3 months of last provider visit (or a total of 15 months).             sertraline (ZOLOFT) 100 MG tablet 180 tablet 0     Sig: Take 2 tablets (200 mg total) by mouth Daily at 8:00 am..       [facility] Refill Protocol  Passed - 6/20/2019 11:41 AM         Passed - PCP or prescribing provider visit in last year     Last office visit with prescriber/PCP: Visit date not found OR same dept: Visit date not found OR same specialty: Visit date not found  Last physical: 12/4/2018 Last MTM visit: Visit date not found   Next visit within 3 mo: Visit date not found  Next physical within 3 mo: Visit date not found  Prescriber OR PCP: Kayla Juarez MD  Last diagnosis associated with med order: 1. Compulsive overeating  - lamoTRIgine (LAMICTAL) 200 MG tablet; Take 1 tablet (200 mg total) by mouth Daily at 8:00 am..  Dispense: 90 tablet; Refill: 0  - sertraline (ZOLOFT) 100 MG tablet; Take 2 tablets (200 mg total) by mouth Daily at 8:00 am..  Dispense: 180 tablet; Refill: 0    2. Episode of recurrent major depressive disorder, unspecified depression episode severity (H)  - lamoTRIgine (LAMICTAL) 200 MG tablet; Take 1 tablet (200 mg total) by mouth Daily at 8:00 am..  Dispense: 90 tablet; Refill: 0  - sertraline (ZOLOFT) 100 MG tablet; Take 2 tablets (200 mg total) by mouth Daily at 8:00 am..  Dispense: 180 tablet; Refill: 0    If protocol passes may refill for 12 months if within 3 months of last provider visit (or a total of 15 months).

## 2021-05-29 NOTE — TELEPHONE ENCOUNTER
Pt has no PCP - NEEDS TO ESTABLISH WITH PRIMARY CARE DOCTOR  Pt called to inform- detailed msg left to schedule appt to establish care with PCP

## 2021-06-01 NOTE — TELEPHONE ENCOUNTER
Refill Approved    Rx renewed per Medication Renewal Policy. Medication was last renewed on 6/21/19.    Sonia Royal, [facility] Triage/Med Refill 9/17/2019     Requested Prescriptions   Pending Prescriptions Disp Refills     lamoTRIgine (LAMICTAL) 200 MG tablet [Pharmacy Med Name: LAMOTRIGINE 200MG TABLETS] 90 tablet 0     Sig: TAKE 1 TABLET BY MOUTH DAILY AT 8 AM       Lamotrigine Refill Protocol Failed - 9/17/2019 12:33 PM        Failed - CBC (Hemogram 2) in last year      No results found for: WBC, RBC, HGB, HCT, MCV, MCH, MCHC, RDW, PLT, MPV             Passed - PCP or prescribing provider visit in past 12 months       Last office visit with prescriber/PCP: 4/22/2019 Tom Loyd MD OR same dept: Visit date not found OR same specialty: Visit date not found  Last physical: Visit date not found Last MTM visit: Visit date not found   Next visit within 3 mo: Visit date not found  Next physical within 3 mo: Visit date not found  Prescriber OR PCP: Tom Loyd MD  Last diagnosis associated with med order: 1. Compulsive overeating  - lamoTRIgine (LAMICTAL) 200 MG tablet [Pharmacy Med Name: LAMOTRIGINE 200MG TABLETS]; TAKE 1 TABLET BY MOUTH DAILY AT 8 AM  Dispense: 90 tablet; Refill: 0  - sertraline (ZOLOFT) 100 MG tablet [Pharmacy Med Name: SERTRALINE 100MG TABLETS]; TAKE 2 TABLETS BY MOUTH DAILY AT 8 AM  Dispense: 180 tablet; Refill: 0    2. Episode of recurrent major depressive disorder, unspecified depression episode severity (H)  - lamoTRIgine (LAMICTAL) 200 MG tablet [Pharmacy Med Name: LAMOTRIGINE 200MG TABLETS]; TAKE 1 TABLET BY MOUTH DAILY AT 8 AM  Dispense: 90 tablet; Refill: 0  - sertraline (ZOLOFT) 100 MG tablet [Pharmacy Med Name: SERTRALINE 100MG TABLETS]; TAKE 2 TABLETS BY MOUTH DAILY AT 8 AM  Dispense: 180 tablet; Refill: 0    If protocol passes may refill for 12 months if within 3 months of last provider visit (or a total of 15 months).             sertraline (ZOLOFT) 100 MG  tablet [Pharmacy Med Name: SERTRALINE 100MG TABLETS] 180 tablet 0     Sig: TAKE 2 TABLETS BY MOUTH DAILY AT 8 AM       [facility] Refill Protocol  Passed - 9/17/2019 12:33 PM        Passed - PCP or prescribing provider visit in last year     Last office visit with prescriber/PCP: 4/22/2019 Tom Loyd MD OR same dept: Visit date not found OR same specialty: Visit date not found  Last physical: Visit date not found Last MTM visit: Visit date not found   Next visit within 3 mo: Visit date not found  Next physical within 3 mo: Visit date not found  Prescriber OR PCP: Tom Loyd MD  Last diagnosis associated with med order: 1. Compulsive overeating  - lamoTRIgine (LAMICTAL) 200 MG tablet [Pharmacy Med Name: LAMOTRIGINE 200MG TABLETS]; TAKE 1 TABLET BY MOUTH DAILY AT 8 AM  Dispense: 90 tablet; Refill: 0  - sertraline (ZOLOFT) 100 MG tablet [Pharmacy Med Name: SERTRALINE 100MG TABLETS]; TAKE 2 TABLETS BY MOUTH DAILY AT 8 AM  Dispense: 180 tablet; Refill: 0    2. Episode of recurrent major depressive disorder, unspecified depression episode severity (H)  - lamoTRIgine (LAMICTAL) 200 MG tablet [Pharmacy Med Name: LAMOTRIGINE 200MG TABLETS]; TAKE 1 TABLET BY MOUTH DAILY AT 8 AM  Dispense: 90 tablet; Refill: 0  - sertraline (ZOLOFT) 100 MG tablet [Pharmacy Med Name: SERTRALINE 100MG TABLETS]; TAKE 2 TABLETS BY MOUTH DAILY AT 8 AM  Dispense: 180 tablet; Refill: 0    If protocol passes may refill for 12 months if within 3 months of last provider visit (or a total of 15 months).

## 2021-06-01 NOTE — TELEPHONE ENCOUNTER
RN cannot approve Refill Request    RN can NOT refill this medication Protocol failed and NO refill given.         Sonia Royal, [facility] Triage/Med Refill 9/17/2019    Requested Prescriptions   Pending Prescriptions Disp Refills     lamoTRIgine (LAMICTAL) 200 MG tablet [Pharmacy Med Name: LAMOTRIGINE 200MG TABLETS] 90 tablet 3     Sig: TAKE 1 TABLET BY MOUTH DAILY AT 8 AM       Lamotrigine Refill Protocol Failed - 9/17/2019 12:33 PM        Failed - CBC (Hemogram 2) in last year      No results found for: WBC, RBC, HGB, HCT, MCV, MCH, MCHC, RDW, PLT, MPV             Passed - PCP or prescribing provider visit in past 12 months       Last office visit with prescriber/PCP: 4/22/2019 Tom Loyd MD OR same dept: Visit date not found OR same specialty: Visit date not found  Last physical: Visit date not found Last MTM visit: Visit date not found   Next visit within 3 mo: Visit date not found  Next physical within 3 mo: Visit date not found  Prescriber OR PCP: Tom Loyd MD  Last diagnosis associated with med order: 1. Compulsive overeating  - lamoTRIgine (LAMICTAL) 200 MG tablet [Pharmacy Med Name: LAMOTRIGINE 200MG TABLETS]; TAKE 1 TABLET BY MOUTH DAILY AT 8 AM  Dispense: 90 tablet; Refill: 0  - sertraline (ZOLOFT) 100 MG tablet; TAKE 2 TABLETS BY MOUTH DAILY AT 8 AM  Dispense: 180 tablet; Refill: 1    2. Episode of recurrent major depressive disorder, unspecified depression episode severity (H)  - lamoTRIgine (LAMICTAL) 200 MG tablet [Pharmacy Med Name: LAMOTRIGINE 200MG TABLETS]; TAKE 1 TABLET BY MOUTH DAILY AT 8 AM  Dispense: 90 tablet; Refill: 0  - sertraline (ZOLOFT) 100 MG tablet; TAKE 2 TABLETS BY MOUTH DAILY AT 8 AM  Dispense: 180 tablet; Refill: 1    If protocol passes may refill for 12 months if within 3 months of last provider visit (or a total of 15 months).           Signed Prescriptions Disp Refills    sertraline (ZOLOFT) 100 MG tablet 180 tablet 1     Sig: TAKE 2 TABLETS BY MOUTH  DAILY AT 8 AM       [facility] Refill Protocol  Passed - 9/17/2019 12:33 PM        Passed - PCP or prescribing provider visit in last year     Last office visit with prescriber/PCP: 4/22/2019 Tom Loyd MD OR same dept: Visit date not found OR same specialty: Visit date not found  Last physical: Visit date not found Last MTM visit: Visit date not found   Next visit within 3 mo: Visit date not found  Next physical within 3 mo: Visit date not found  Prescriber OR PCP: Tom Loyd MD  Last diagnosis associated with med order: 1. Compulsive overeating  - lamoTRIgine (LAMICTAL) 200 MG tablet [Pharmacy Med Name: LAMOTRIGINE 200MG TABLETS]; TAKE 1 TABLET BY MOUTH DAILY AT 8 AM  Dispense: 90 tablet; Refill: 0  - sertraline (ZOLOFT) 100 MG tablet; TAKE 2 TABLETS BY MOUTH DAILY AT 8 AM  Dispense: 180 tablet; Refill: 1    2. Episode of recurrent major depressive disorder, unspecified depression episode severity (H)  - lamoTRIgine (LAMICTAL) 200 MG tablet [Pharmacy Med Name: LAMOTRIGINE 200MG TABLETS]; TAKE 1 TABLET BY MOUTH DAILY AT 8 AM  Dispense: 90 tablet; Refill: 0  - sertraline (ZOLOFT) 100 MG tablet; TAKE 2 TABLETS BY MOUTH DAILY AT 8 AM  Dispense: 180 tablet; Refill: 1    If protocol passes may refill for 12 months if within 3 months of last provider visit (or a total of 15 months).

## 2021-06-01 NOTE — TELEPHONE ENCOUNTER
My note from 12/4/18:    2. Recurrent major depressive disorder, in full remission (H)/3. Compulsive overeating  Depression is well controlled but over-eating is an ongoing issue and she needs a new counselor, as her counselor at [facility] is no longer available   - OK future refills for lamotrigine and sertraline  - Comprehensive Metabolic Panel  - Ambulatory referral to Psychology      Please DO remind her she would be due for a physical/med check in December

## 2021-06-02 VITALS — BODY MASS INDEX: 49.52 KG/M2 | HEIGHT: 64 IN | WEIGHT: 290.06 LBS

## 2021-06-02 VITALS — WEIGHT: 281.5 LBS | BODY MASS INDEX: 48.06 KG/M2 | HEIGHT: 64 IN

## 2021-06-04 NOTE — TELEPHONE ENCOUNTER
CBC is not a monitoring parameter, however liver/renal fucntion is (from UpToDate)      Monitoring Parameters   Serum levels of concurrent anticonvulsants, LFTs, renal function, hypersensitivity reactions (especially rash); seizure, frequency and duration; suicidality (eg, suicidal thoughts, depression, behavioral changes); signs/symptoms of aseptic meningitis    She is due for at minimum a follow up visit, or she can schedule her annual exam.  She should come for fasting labs a day or two before - I am including a screen for hepatitis C recommended for ALL baby boomers.  - please call her to schedule - labs ordered and short refill given

## 2021-06-04 NOTE — TELEPHONE ENCOUNTER
RN cannot approve Refill Request    RN can NOT refill this medication Protocol failed and NO refill given.         Sonia Royal, [facility] [place] Connection Triage/Med Refill 12/19/2019    Requested Prescriptions   Pending Prescriptions Disp Refills     lamoTRIgine (LAMICTAL) 200 MG tablet 90 tablet 0     Sig: TAKE 1 TABLET BY MOUTH DAILY AT 8 AM       Lamotrigine Refill Protocol Failed - 12/18/2019  1:06 PM        Failed - CBC (Hemogram 2) in last year      No results found for: WBC, RBC, HGB, HCT, MCV, MCH, MCHC, RDW, PLT, MPV             Failed - PCP or prescribing provider visit in past 12 months       Last office visit with prescriber/PCP: Visit date not found OR same dept: Visit date not found OR same specialty: Visit date not found  Last physical: 12/4/2018 Last MTM visit: Visit date not found   Next visit within 3 mo: Visit date not found  Next physical within 3 mo: Visit date not found  Prescriber OR PCP: Kayla Juarez MD  Last diagnosis associated with med order: 1. Compulsive overeating  - lamoTRIgine (LAMICTAL) 200 MG tablet; TAKE 1 TABLET BY MOUTH DAILY AT 8 AM  Dispense: 90 tablet; Refill: 0    2. Episode of recurrent major depressive disorder, unspecified depression episode severity (H)  - lamoTRIgine (LAMICTAL) 200 MG tablet; TAKE 1 TABLET BY MOUTH DAILY AT 8 AM  Dispense: 90 tablet; Refill: 0    If protocol passes may refill for 12 months if within 3 months of last provider visit (or a total of 15 months).

## 2021-06-07 NOTE — TELEPHONE ENCOUNTER
"Jenny saw me in 18 for a physical and asked for refills of medicaitons 19 (see refill done) .  I gave a short refill and advised her that:    She is due for at minimum a follow up visit, or she can schedule her annual exam.  She should come for fasting labs a day or two before - I am including a screen for hepatitis C recommended for ALL baby boomers.  - please call her to schedule - labs ordered and short refill given    This was sent to her as a 3scale message    Please call Jenny: She still has not come in for labs and her refills and will have run out of medications by now.  Please call her  - is she getting care elsewhere? Or did she not see our 3scale message? She DOES absolutely need labs and she can do a follow up visit by phone until we can get her in for an annual after the quarantine measures are over      ----- Message from Admin, Epic sent at 3/20/2020 11:05 PM CDT -----  Regarding: Cancellation of Order # [id number]  Order number [id number] for the procedure HEPATITIS C ANTIBODY   (ANTI-HCV) [YKR708] has been canceled by Admin, Epic [ADMIN].   This procedure was ordered by Kayla Juarez MD [[id number]] on Dec   19, 2019 for the patient Jenny Robbins [[id number]]. The reason  for cancellation was \"Order \".    This was a future order.    "

## 2021-06-22 NOTE — PROGRESS NOTES
FEMALE PREVENTATIVE EXAM    Assessment and Plan:       1. Routine general medical examination at a health care facility    2. Recurrent major depressive disorder, in full remission (H)/3. Compulsive overeating  Depression is well controlled but over-eating is an ongoing issue and she needs a new counselor, as her counselor at [facility] is no longer available   - OK future refills for lamotrigine and sertraline  - Comprehensive Metabolic Panel  - Ambulatory referral to Psychology      Next follow up:  No Follow-up on file.    Immunization Review  Adult Imm Review: No immunizations due today    I discussed the following with the patient:   Adult Healthy Living: Importance of regular exercise  Getting adequate sleep  Stress management        Subjective:   Chief Complaint: Jenny Robbins is an 53 y.o. female here for a preventative health visit.     HPI:  Jenny had previously been seen in the [place] system and comes to establish care here    Jenny  has been a patient at the [facility] of close to 10 years now.  She is seeing a counselor and psychiatrist there for compulsive eating and depression .  She was in a day treatment for a month in 2015 for compulsive overeating. The [facility] has now changed their model - they are just treating severe cases.  Counselor is taking on a new role and is traveling. She would like to find a new counselor and is looking for someone in her network. She would be OK with my placing a general referral about which she can contact us later with a specific request    She feels her depression has been  stable for a while,and she had been maintained on the same does of medications for a while on sertraline and lamotrigine - for mood stabilization    Little interest or pleasure in doing things: Not at all  Feeling down, depressed, or hopeless: Several days  Trouble falling or staying asleep, or sleeping too much: Not at all  Feeling tired or having little energy: Not at  "all  Poor appetite or overeating: Several days  Feeling bad about yourself - or that you are a failure or have let yourself or your family down: Several days  Trouble concentrating on things, such as reading the newspaper or watching television: Not at all  Moving or speaking so slowly that other people could have noticed. Or the opposite - being so fidgety or restless that you have been moving around a lot more than usual: Not at all  Thoughts that you would be better off dead, or of hurting yourself in some way: Several days  PHQ-9 Total Score: 4  If you checked off any problems, how difficult have these problems made it for you to do your work, take care of things at home, or get along with other people?: Not difficult at all    I note with her that lamotrigine needs monitoring - looking at labs done in [facility] everywhere, her last chemistry panel, A1c  (these both normal) and Lipid panel (abnormal, below) were last year. She say regarding the cholesterol\" I can get it down pretty quickly if I eat less junk food .\"       Lipid Cvsifsb07/24/2017  [place]  Component Name Value Ref Range   Cholesterol 237 (H)   Comment: Desirable: <200 mg/dl <200 mg/dL   Triglycerides 212 (H)   Comment:  Borderline high:  150-199 mg/dl  High:             200-499 mg/dl  Very high:       >499 mg/dl  Non Fasting <150 mg/dL   HDL Cholesterol 64 >49 mg/dL   LDL Cholesterol Calculated 131 (H)   Comment:  Above desirable:  100-129 mg/dl  Borderline High:  130-159 mg/dL  High:             160-189 mg/dL  Very high:       >189 mg/dl <100 mg/dL   Non HDL Cholesterol 173 (H)   Comment:  Above Desirable:  130-159 mg/dl  Borderline high:  160-189 mg/dl  High:             190-219 mg/dl  Very high:       >219 mg/dl        Here is what Up-To-Date says about monitoring  Lamotrigine monitoring:  Monitoring Parameters   Serum levels of concurrent anticonvulsants, LFTs, renal function, hypersensitivity reactions (especially rash); seizure, " "frequency and duration; suicidality (eg, suicidal thoughts, depression, behavioral changes); signs/symptoms of aseptic meningitis  ---    Omeprazole is for \"digestion\" - doesn't take it every day - manages symptoms by not eating before going to bed . \"there was a while when kept getting sick - sinus stuff - I though it was related to acid reflux - then I went and had a 'throat scope' with ENT  - it was normal.\"    Has been seeing an allergist for a while for an uncontrollable cough - nothing would get rid of it except for Augmentin - would happen 1-2 x a year. Has been getting allergy shots - doesn't get coughs anymore    She has already gone through menopause    Preventive   - Tdap and influenza up to date    - colonoscopy - is supposed to go back every 3 years - last 1/20/2017  - Pap done 10/3/17:    Care Everywhere Result Report  HPV High Risk Types DNA Shhffjgg66/3/2016  [place]  Component Name Value Ref Range   HPV 16 DNA Negative NEG    HPV 18 DNA Negative NEG    Other HR HPV Negative NEG      PAP NIL     Copath Report   Patient Name: FIDENCIO HIRSCH  MR#: [id number]  Specimen #: [id number]  Collected: 10/3/2016  Received: 10/4/2016  Reported: 10/5/2016 09:39  Ordering Phy(s): RUSSEL WILLIS    SPECIMEN/STAIN PROCESS:  Pap imaged thin layer prep screening (Surepath, FocalPoint with guided  screening)       Pap-Cyto x 1, HPV ordered x 1    SOURCE: Cervical, endocervical  ----------------------------------------------------------------  Pap imaged thin layer prep screening (Surepath, FocalPoint with guided  screening)  SPECIMEN ADEQUACY:  Satisfactory for evaluation.  -Transitional zone component could not be determined due to atrophy.    CYTOLOGIC INTERPRETATION:    Negative for Intraepithelial Lesion or Malignancy    Electronically signed out by:  LAISHA Vieira (ASCP)          Social: She is a reported for the [employer] - sports  - reported on [employer] and [employer] events and " whatever they need me to do    Healthy Habits  Are you taking a daily aspirin? No  Do you typically exercising at least 40 min, 3-4 times per week?  NO - she swims occasionally a mile each time; likes exercise but goes through periods where she is not motivated; did yoga previously   Do you usually eat at least 4 servings of fruit and vegetables a day, include whole grains and fiber and avoid regularly eating high fat foods? NO - she knows how to eat - has been in therapy for ten years for why she doesn't do the right thing.  Has vacation coming up in February - [place]  Have you had an eye exam in the past two years? Yes  Do you see a dentist twice per year? Yes  Do you have any concerns regarding sleep? No    Safety Screen  If you own firearms, are they secured in a locked gun cabinet or with trigger locks? Doesn't have firearms   Do you feel you are safe where you are living?: Yes (12/4/2018  1:10 PM)  Do you feel you are safe in your relationship(s)?: Yes (12/4/2018  1:10 PM)      Review of Systems:   Constitutional: negative for recent illness or change in weight  Eyes: negative for irritation and vision change  Ears, nose, mouth, throat, and face: negative for nasal congestion and sore throat  Respiratory: negative for cough and dyspnea on exertion  Cardiovascular: negative for chest pain and palpitations  Gastrointestinal: negative for abdominal pain  and change in bowel habits  Genitourinary:negative for dysuria, frequency and hesitancy; UTIs previously  Integument/breast: negative for rash  Hematologic/lymphatic: negative for bleeding and easy bruising  Musculoskeletal:negative for arthralgias and myalgias  Neurological: negative for dizziness, headaches and paresthesia        Cancer Screening       Status Date      MAMMOGRAM Next Due 10/31/2019      Done 10/31/2018 Ext Proc: CHG SCREENING MAMMOGRAPHY BI 2-VIEW BREAST INC CAD    COLONOSCOPY Next Due 1/20/2020      Done 1/20/2017     PAP SMEAR Next Due  "10/3/2021      Done 10/3/2016 see [facility] - NIL,negative HPV - see visit note 12/4/18              History     Reviewed By Date/Time Sections Reviewed    Kayla Juarez MD 12/4/2018  1:46 PM Family    Kayla Juarez MD 12/4/2018  1:43 PM Surgical    Kayla Juarez MD 12/4/2018  1:42 PM Medical    Sanford Hernandes CMA 12/4/2018  1:16 PM Tobacco, Alcohol, Drug Use, Sexual Activity            Objective:   Vital Signs:   Visit Vitals  /82 (Patient Site: Right Arm, Patient Position: Sitting, Cuff Size: Adult Large)   Pulse 73   Temp 98  F (36.7  C)   Ht 5' 3.5\" (1.613 m)   Wt (!) 281 lb 8 oz (127.7 kg)   SpO2 97%   BMI 49.08 kg/m           PHYSICAL EXAM  General appearance - alert, well appearing, and in no distress and morbidly obese  Mental status - constricted mood,normal behavior, speech, dress, motor activity, and thought processes  Eyes - pupils equal and reactive, extraocular eye movements intact  Ears - bilateral TM's and external ear canals normal  Mouth - mucous membranes moist, pharynx normal without lesions  Neck - supple, no significant adenopathy, carotids upstroke normal bilaterally, no bruits, thyroid exam: thyroid is normal in size without nodules or tenderness  Chest - clear to auscultation, no wheezes, rales or rhonchi, symmetric air entry  Heart - normal rate, regular rhythm, normal S1, S2, no murmurs, rubs, clicks or gallops  Abdomen - soft, nontender, nondistended, no masses or organomegaly  Breasts - breasts appear normal, no suspicious masses, no skin or nipple changes or axillary nodes  Neurological - alert, oriented, normal speech, no focal findings or movement disorder noted, DTR's normal and symmetric  Extremities - peripheral pulses normal, no pedal edema, no clubbing or cyanosis. Slight enlargement of right index finger DIP  Skin -no rashes or worrisome lesions       "

## 2021-06-25 NOTE — TELEPHONE ENCOUNTER
You have not prescribed this to pt but in your note, you mentioned that it's okay to do future refills for Lamotrigine and Sertraline.  Will you refill as you're the only one who had seen pt?

## 2021-10-24 ENCOUNTER — HEALTH MAINTENANCE LETTER (OUTPATIENT)
Age: 56
End: 2021-10-24

## 2022-02-13 ENCOUNTER — HEALTH MAINTENANCE LETTER (OUTPATIENT)
Age: 57
End: 2022-02-13

## 2022-04-10 ENCOUNTER — HEALTH MAINTENANCE LETTER (OUTPATIENT)
Age: 57
End: 2022-04-10

## 2022-10-10 ENCOUNTER — HEALTH MAINTENANCE LETTER (OUTPATIENT)
Age: 57
End: 2022-10-10

## 2023-03-25 ENCOUNTER — HEALTH MAINTENANCE LETTER (OUTPATIENT)
Age: 58
End: 2023-03-25